# Patient Record
Sex: MALE | Race: WHITE | NOT HISPANIC OR LATINO
[De-identification: names, ages, dates, MRNs, and addresses within clinical notes are randomized per-mention and may not be internally consistent; named-entity substitution may affect disease eponyms.]

---

## 2017-10-20 ENCOUNTER — APPOINTMENT (OUTPATIENT)
Dept: RADIOLOGY | Facility: HOSPITAL | Age: 47
End: 2017-10-20
Payer: MEDICAID

## 2017-10-20 ENCOUNTER — OUTPATIENT (OUTPATIENT)
Dept: OUTPATIENT SERVICES | Facility: HOSPITAL | Age: 47
LOS: 1 days | End: 2017-10-20
Payer: MEDICAID

## 2017-10-20 PROCEDURE — 72100 X-RAY EXAM L-S SPINE 2/3 VWS: CPT | Mod: 26

## 2017-10-20 PROCEDURE — 73521 X-RAY EXAM HIPS BI 2 VIEWS: CPT | Mod: 26

## 2017-10-20 PROCEDURE — 72040 X-RAY EXAM NECK SPINE 2-3 VW: CPT | Mod: 26

## 2017-10-20 PROCEDURE — 73521 X-RAY EXAM HIPS BI 2 VIEWS: CPT

## 2017-10-20 PROCEDURE — 72100 X-RAY EXAM L-S SPINE 2/3 VWS: CPT

## 2017-10-20 PROCEDURE — 72040 X-RAY EXAM NECK SPINE 2-3 VW: CPT

## 2018-03-27 ENCOUNTER — APPOINTMENT (OUTPATIENT)
Dept: NEUROLOGY | Facility: CLINIC | Age: 48
End: 2018-03-27
Payer: MEDICAID

## 2018-03-27 VITALS
HEART RATE: 61 BPM | DIASTOLIC BLOOD PRESSURE: 84 MMHG | BODY MASS INDEX: 25.18 KG/M2 | WEIGHT: 190 LBS | SYSTOLIC BLOOD PRESSURE: 138 MMHG | HEIGHT: 73 IN

## 2018-03-27 DIAGNOSIS — Z78.9 OTHER SPECIFIED HEALTH STATUS: ICD-10-CM

## 2018-03-27 DIAGNOSIS — M79.606 PAIN IN LEG, UNSPECIFIED: ICD-10-CM

## 2018-03-27 DIAGNOSIS — S06.9X9A UNSPECIFIED INTRACRANIAL INJURY WITH LOSS OF CONSCIOUSNESS OF UNSPECIFIED DURATION, INITIAL ENCOUNTER: ICD-10-CM

## 2018-03-27 PROCEDURE — 99205 OFFICE O/P NEW HI 60 MIN: CPT

## 2018-06-06 ENCOUNTER — APPOINTMENT (OUTPATIENT)
Dept: ORTHOPEDIC SURGERY | Facility: CLINIC | Age: 48
End: 2018-06-06

## 2018-08-03 DIAGNOSIS — R41.89 OTHER SYMPTOMS AND SIGNS INVOLVING COGNITIVE FUNCTIONS AND AWARENESS: ICD-10-CM

## 2018-08-03 DIAGNOSIS — F02.80 OTHER FRONTOTEMPORAL DEMENTIA: ICD-10-CM

## 2018-08-03 DIAGNOSIS — G31.09 OTHER FRONTOTEMPORAL DEMENTIA: ICD-10-CM

## 2018-08-03 DIAGNOSIS — A92.30 WEST NILE VIRUS INFECTION, UNSPECIFIED: ICD-10-CM

## 2018-08-03 DIAGNOSIS — F42.9 OBSESSIVE-COMPULSIVE DISORDER, UNSPECIFIED: ICD-10-CM

## 2021-03-22 ENCOUNTER — OUTPATIENT (OUTPATIENT)
Dept: OUTPATIENT SERVICES | Facility: HOSPITAL | Age: 51
LOS: 1 days | End: 2021-03-22
Payer: MEDICAID

## 2021-03-22 ENCOUNTER — APPOINTMENT (OUTPATIENT)
Dept: CT IMAGING | Facility: HOSPITAL | Age: 51
End: 2021-03-22
Payer: MEDICAID

## 2021-03-22 DIAGNOSIS — K43.6 OTHER AND UNSPECIFIED VENTRAL HERNIA WITH OBSTRUCTION, WITHOUT GANGRENE: ICD-10-CM

## 2021-03-22 PROCEDURE — 74176 CT ABD & PELVIS W/O CONTRAST: CPT

## 2021-03-22 PROCEDURE — 74176 CT ABD & PELVIS W/O CONTRAST: CPT | Mod: 26

## 2021-05-04 ENCOUNTER — APPOINTMENT (OUTPATIENT)
Dept: NUCLEAR MEDICINE | Facility: CLINIC | Age: 51
End: 2021-05-04
Payer: MEDICAID

## 2021-05-04 ENCOUNTER — OUTPATIENT (OUTPATIENT)
Dept: OUTPATIENT SERVICES | Facility: HOSPITAL | Age: 51
LOS: 1 days | End: 2021-05-04
Payer: MEDICAID

## 2021-05-04 DIAGNOSIS — Z00.8 ENCOUNTER FOR OTHER GENERAL EXAMINATION: ICD-10-CM

## 2021-05-04 PROCEDURE — 78815 PET IMAGE W/CT SKULL-THIGH: CPT | Mod: 26,PI

## 2021-05-04 PROCEDURE — 78815 PET IMAGE W/CT SKULL-THIGH: CPT

## 2021-05-04 PROCEDURE — A9552: CPT

## 2022-08-02 ENCOUNTER — APPOINTMENT (OUTPATIENT)
Dept: RADIOLOGY | Facility: HOSPITAL | Age: 52
End: 2022-08-02

## 2022-08-02 ENCOUNTER — OUTPATIENT (OUTPATIENT)
Dept: OUTPATIENT SERVICES | Facility: HOSPITAL | Age: 52
LOS: 1 days | End: 2022-08-02
Payer: MEDICAID

## 2022-08-02 DIAGNOSIS — Z00.8 ENCOUNTER FOR OTHER GENERAL EXAMINATION: ICD-10-CM

## 2022-08-02 PROCEDURE — 73080 X-RAY EXAM OF ELBOW: CPT | Mod: 26,RT

## 2022-08-02 PROCEDURE — 73130 X-RAY EXAM OF HAND: CPT | Mod: 26,50

## 2022-08-02 PROCEDURE — 73130 X-RAY EXAM OF HAND: CPT

## 2022-08-02 PROCEDURE — 73080 X-RAY EXAM OF ELBOW: CPT

## 2022-09-06 ENCOUNTER — APPOINTMENT (OUTPATIENT)
Dept: ORTHOPEDIC SURGERY | Facility: CLINIC | Age: 52
End: 2022-09-06

## 2022-09-06 VITALS — BODY MASS INDEX: 25.18 KG/M2 | WEIGHT: 190 LBS | HEIGHT: 73 IN

## 2022-09-06 DIAGNOSIS — S69.91XA UNSPECIFIED INJURY OF RIGHT WRIST, HAND AND FINGER(S), INITIAL ENCOUNTER: ICD-10-CM

## 2022-09-06 DIAGNOSIS — S63.636A SPRAIN OF INTERPHALANGEAL JOINT OF RIGHT LITTLE FINGER, INITIAL ENCOUNTER: ICD-10-CM

## 2022-09-06 PROCEDURE — 99203 OFFICE O/P NEW LOW 30 MIN: CPT

## 2022-09-06 PROCEDURE — 73140 X-RAY EXAM OF FINGER(S): CPT

## 2022-09-06 RX ORDER — IBUPROFEN 800 MG/1
800 TABLET, FILM COATED ORAL
Qty: 30 | Refills: 3 | Status: ACTIVE | COMMUNITY
Start: 2022-09-06 | End: 1900-01-01

## 2022-09-06 NOTE — END OF VISIT
[FreeTextEntry3] : All medical record entries made by the Scribe were at my, Dr. Constantin Curran MD., direction and personally dictated by me on 09/06/2022. I have personally reviewed the chart and agree that the record accurately reflects my personal performance of the history, physical exam, assessment and plan.

## 2022-09-06 NOTE — PHYSICAL EXAM
[de-identified] : Patient is WDWN, alert, and in no acute distress. Breathing is unlabored. He is grossly oriented to person, place, and time.\par \par Right Hand (Little Finger):\par Edema and tenderness along radial aspect of the PIP joint of the little finger.\par Full ROM and can make a fist.\par Normal sensation to light touch. [de-identified] : AP, lateral and oblique views of the RIGHT little finger were obtained today and revealed no abnormalities. No acute fracture. No dislocation. Cartilage spaces are maintained. \par \par ------------------------------------------------------------------------------------------------------------------------------------------------------------------------------------\par \par EXAM: XR HAND MIN 3 VIEWS BI\par EXAM: XR ELBOW COMP MIN 3V RT\par PROCEDURE DATE: 08/02/2022\par IMPRESSION:\par EXAM: Radiographs of the right elbow\par The osseous structures of the elbow are intact, without fracture or destructive lesion. No joint effusion is seen. No loose body is found.\par \par No soft tissue abnormalities are seen. No radiopaque foreign body is seen.\par \par EXAM: Radiographs of the right hand\par The osseous structures of the hand are intact, without fracture or malalignment. Joint spaces appear intact. No soft tissue abnormalities are seen. No radiopaque foreign body is seen.\par \par EXAM: Radiographs of the left hand\par The osseous structures of the hand are intact, without fracture or malalignment. Joint spaces appear intact. No soft tissue abnormalities are seen. No radiopaque foreign body is seen.\par \par JAN COOL MD; Attending Radiologist\par This document has been electronically signed. Aug 2 2022 4:31PM

## 2022-09-06 NOTE — HISTORY OF PRESENT ILLNESS
[de-identified] : Patient is a 51 year old RHD male who presents with complaints of right pinky finger pain after an injury about a month ago. 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 He thought nothing at the time but his symptoms have not improved. He notes the finger "looks crooked." It is swollen and painful with ROM. He notes no numbness and tingling. He denies prior treatment. Has not tried joseph taping. He was sent for xrays by his PCP, Dr. Vahe Manzano, done on 8/2/22 and available in [facility]. \par He also has complaints of right long finger triggering. He claims this does not bother him.

## 2022-09-06 NOTE — ADDENDUM
[FreeTextEntry1] : I, Everton Payne, wrote this note acting as a scribe for Dr. Constantin Curran on Sep 06, 2022.

## 2022-09-06 NOTE — DISCUSSION/SUMMARY
[de-identified] : The underlying pathophysiology was reviewed with the patient. XR films were reviewed with the patient. Discussed at length the nature of the patient’s condition. The right little finger symptoms appear secondary to collateral ligament strain.\par \par Treatment options were discussed including; observation, NSAIDs, topical analgesics, joseph taping, hand therapy, and surgical intervention. \par \par A script was given for Ibuprofen 800mg BID - 30 tablets.\par Patient was advised surgical intervention is not indicated and his finger will heal over time.\par He was shown how to joseph tape the 4th and 5th fingers with Coban.\par I instructed him on ROM exercises to do at home. \par \par I advised him multiple times that negative xray findings do not rule out a soft tissue injury.\par We discussed a possible MRI. I advised him it will show ligament damage but will not change the treatment plan. The patient wishes to proceed with an MRI. An MRI of the right little finger was ordered to evaluate for ligament injury.\par \par All questions answered, understanding verbalized. Patient in agreement with plan of care. Patient will follow-up to review the MRI results.

## 2024-03-20 ENCOUNTER — APPOINTMENT (OUTPATIENT)
Dept: CARDIOLOGY | Facility: CLINIC | Age: 54
End: 2024-03-20
Payer: MEDICAID

## 2024-03-20 ENCOUNTER — NON-APPOINTMENT (OUTPATIENT)
Age: 54
End: 2024-03-20

## 2024-03-20 VITALS
BODY MASS INDEX: 25.31 KG/M2 | WEIGHT: 191 LBS | HEIGHT: 73 IN | SYSTOLIC BLOOD PRESSURE: 130 MMHG | OXYGEN SATURATION: 95 % | HEART RATE: 64 BPM | DIASTOLIC BLOOD PRESSURE: 88 MMHG

## 2024-03-20 DIAGNOSIS — Z00.00 ENCOUNTER FOR GENERAL ADULT MEDICAL EXAMINATION W/OUT ABNORMAL FINDINGS: ICD-10-CM

## 2024-03-20 PROCEDURE — 93000 ELECTROCARDIOGRAM COMPLETE: CPT

## 2024-03-20 PROCEDURE — 99203 OFFICE O/P NEW LOW 30 MIN: CPT | Mod: 25

## 2024-03-20 NOTE — HISTORY OF PRESENT ILLNESS
[FreeTextEntry1] : 53-year-old male self-referred for cardiac evaluation because "I want my arteries checked".  He is in good general health with no prior history of heart disease or any other significant medical problems.  He takes no medication and is unaware of any risk factors.  He is physically active with no exertional complaints.  He was hospitalized with carbon monoxide poisoning about 6 months ago and has heard it could damage his arteries.

## 2024-03-20 NOTE — DISCUSSION/SUMMARY
[FreeTextEntry1] : In summary, Mr. Owens is a 53-year-old male who was exposed to carbon monoxide and is concerned about possible effects on his vascular system.  His exam shows regular rhythm, normal blood pressure, clear lungs, and a normal cardiac exam.  An EKG is within normal limits.  I reassured him that there was no evidence of coronary disease or any other difficult medical problems.  He remains concerned so I suggested he have a coronary artery calcium score done to look for any early atherosclerosis.   No drug therapy is needed.  He will follow-up here on a as needed basis. [EKG obtained to assist in diagnosis and management of assessed problem(s)] : EKG obtained to assist in diagnosis and management of assessed problem(s)

## 2024-03-20 NOTE — PHYSICAL EXAM
[Well Developed] : well developed [Well Nourished] : well nourished [Normal Conjunctiva] : normal conjunctiva [No Acute Distress] : no acute distress [No Carotid Bruit] : no carotid bruit [Normal Venous Pressure] : normal venous pressure [No Murmur] : no murmur [Normal S1, S2] : normal S1, S2 [No Rub] : no rub [Clear Lung Fields] : clear lung fields [No Gallop] : no gallop [Good Air Entry] : good air entry [No Respiratory Distress] : no respiratory distress  [Soft] : abdomen soft [Non Tender] : non-tender [No Masses/organomegaly] : no masses/organomegaly [Normal Bowel Sounds] : normal bowel sounds [Normal Gait] : normal gait [No Edema] : no edema [No Cyanosis] : no cyanosis [No Clubbing] : no clubbing [No Rash] : no rash [No Varicosities] : no varicosities [No Skin Lesions] : no skin lesions [Moves all extremities] : moves all extremities [No Focal Deficits] : no focal deficits [Normal Speech] : normal speech [Alert and Oriented] : alert and oriented [Normal memory] : normal memory

## 2024-03-29 DIAGNOSIS — R06.00 DYSPNEA, UNSPECIFIED: ICD-10-CM

## 2024-04-04 ENCOUNTER — APPOINTMENT (OUTPATIENT)
Dept: CT IMAGING | Facility: CLINIC | Age: 54
End: 2024-04-04

## 2024-05-20 ENCOUNTER — APPOINTMENT (OUTPATIENT)
Dept: RADIOLOGY | Facility: HOSPITAL | Age: 54
End: 2024-05-20

## 2024-05-20 ENCOUNTER — OUTPATIENT (OUTPATIENT)
Dept: OUTPATIENT SERVICES | Facility: HOSPITAL | Age: 54
LOS: 1 days | End: 2024-05-20
Payer: MEDICAID

## 2024-05-20 DIAGNOSIS — Z00.8 ENCOUNTER FOR OTHER GENERAL EXAMINATION: ICD-10-CM

## 2024-05-20 PROCEDURE — 72100 X-RAY EXAM L-S SPINE 2/3 VWS: CPT | Mod: 26

## 2024-05-20 PROCEDURE — 72100 X-RAY EXAM L-S SPINE 2/3 VWS: CPT

## 2024-06-04 ENCOUNTER — APPOINTMENT (OUTPATIENT)
Dept: ULTRASOUND IMAGING | Facility: HOSPITAL | Age: 54
End: 2024-06-04
Payer: MEDICAID

## 2024-06-04 ENCOUNTER — OUTPATIENT (OUTPATIENT)
Dept: OUTPATIENT SERVICES | Facility: HOSPITAL | Age: 54
LOS: 1 days | End: 2024-06-04
Payer: MEDICAID

## 2024-06-04 DIAGNOSIS — Z00.8 ENCOUNTER FOR OTHER GENERAL EXAMINATION: ICD-10-CM

## 2024-06-04 PROCEDURE — 76536 US EXAM OF HEAD AND NECK: CPT | Mod: 26

## 2024-06-04 PROCEDURE — 76536 US EXAM OF HEAD AND NECK: CPT

## 2025-02-03 ENCOUNTER — APPOINTMENT (OUTPATIENT)
Dept: ENDOCRINOLOGY | Facility: CLINIC | Age: 55
End: 2025-02-03